# Patient Record
Sex: FEMALE | Race: WHITE | NOT HISPANIC OR LATINO | Employment: FULL TIME | URBAN - METROPOLITAN AREA
[De-identification: names, ages, dates, MRNs, and addresses within clinical notes are randomized per-mention and may not be internally consistent; named-entity substitution may affect disease eponyms.]

---

## 2024-04-02 ENCOUNTER — HOSPITAL ENCOUNTER (OUTPATIENT)
Dept: RADIOLOGY | Facility: EXTERNAL LOCATION | Age: 24
Discharge: HOME | End: 2024-04-02

## 2024-04-02 DIAGNOSIS — M25.561 RIGHT KNEE PAIN, UNSPECIFIED CHRONICITY: ICD-10-CM

## 2024-04-04 ENCOUNTER — OFFICE VISIT (OUTPATIENT)
Dept: ORTHOPEDIC SURGERY | Facility: CLINIC | Age: 24
End: 2024-04-04
Payer: COMMERCIAL

## 2024-04-04 VITALS — WEIGHT: 170 LBS | BODY MASS INDEX: 27.32 KG/M2 | HEIGHT: 66 IN

## 2024-04-04 DIAGNOSIS — M22.2X1 RIGHT PATELLOFEMORAL SYNDROME: Primary | ICD-10-CM

## 2024-04-04 PROCEDURE — 99203 OFFICE O/P NEW LOW 30 MIN: CPT | Performed by: ORTHOPAEDIC SURGERY

## 2024-04-04 PROCEDURE — 1036F TOBACCO NON-USER: CPT | Performed by: ORTHOPAEDIC SURGERY

## 2024-04-04 NOTE — PROGRESS NOTES
Subjective    Patient ID: Cliff Helms is a 23 y.o. female.    Chief Complaint: Pain of the Right Knee     Last Surgery: No surgery found  Last Surgery Date: No surgery found    HPI  Patient is a 23-year-old female who comes in with about a 1 week history of right knee pain.  Most of the pain is near the patella itself.  She does not recall 1 specific injury.  However the patient does a fair amount of walking each day including up and down ladders.  Her symptoms now include difficulty ascending and descending stairs.  She did notice swelling which has not resolved over the past week.  She had x-rays obtained 2 days earlier.    Of note when in high school the patient played softball and volleyball.  One of the position she played in softball was catcher.    Objective   Ortho Exam  Patient is in no acute distress.  She walks with no evidence of an antalgic gait.  Exam of the patient's right knee reveals her skin envelope is intact.  She has no obvious effusion.  She is tender near the inferior pole of the patella.  She is also tender at both the medial and lateral aspects of the superior pole.  There is no evidence of patellar laxity.  However she does complain of pain with palpation of the lateral border of the patella.  She has a negative Diana's test.  Knee is stable to varus and valgus stress testing.  She has a negative Lachman's test.  Active range of motion is from 0 to almost 130 degrees of flexion.    Image Results:  Urgent Care Xray  Narrative: Interpreted By:  Jami Alvarez,   STUDY:  [facility]; ;  4/2/2024 8:42 am      INDICATION:  Signs/Symptoms:injury.      COMPARISON:  None.      ACCESSION NUMBER(S):  [id number]      ORDERING CLINICIAN:  JENSEN LINDSAY      FINDINGS:  Three views right knee:  There is no fracture or dislocation.  There is no joint effusion.      Impression: Negative right knee.          MACRO:  None      Signed by: Jami Alvarez 4/2/2024 8:57 AM  Dictation  workstation:   [id number]      Assessment/Plan   Encounter Diagnoses:  Right patellofemoral syndrome    Orders Placed This Encounter    Referral to Physical Therapy     Patient's right knee pain is likely secondary to patellofemoral syndrome.  I explained the patient that typically the first-line of treatment for this who is therapy.  She was provided a prescription for this.  She will follow-up as her symptoms dictate.

## 2024-04-18 ENCOUNTER — APPOINTMENT (OUTPATIENT)
Dept: ORTHOPEDIC SURGERY | Facility: CLINIC | Age: 24
End: 2024-04-18
Payer: COMMERCIAL